# Patient Record
Sex: FEMALE | Race: WHITE | Employment: FULL TIME
[De-identification: names, ages, dates, MRNs, and addresses within clinical notes are randomized per-mention and may not be internally consistent; named-entity substitution may affect disease eponyms.]

---

## 2017-02-06 ENCOUNTER — OFFICE VISIT (OUTPATIENT)
Dept: FAMILY MEDICINE CLINIC | Facility: CLINIC | Age: 57
End: 2017-02-06

## 2017-02-06 VITALS
DIASTOLIC BLOOD PRESSURE: 64 MMHG | BODY MASS INDEX: 20.25 KG/M2 | SYSTOLIC BLOOD PRESSURE: 120 MMHG | TEMPERATURE: 98.3 F | HEIGHT: 67 IN | HEART RATE: 68 BPM | RESPIRATION RATE: 18 BRPM | WEIGHT: 129 LBS

## 2017-02-06 DIAGNOSIS — J01.00 ACUTE NON-RECURRENT MAXILLARY SINUSITIS: Primary | ICD-10-CM

## 2017-02-06 PROCEDURE — 99213 OFFICE O/P EST LOW 20 MIN: CPT | Performed by: NURSE PRACTITIONER

## 2017-02-06 RX ORDER — FLUTICASONE PROPIONATE 50 MCG
2 SPRAY, SUSPENSION (ML) NASAL DAILY
Qty: 1 BOTTLE | Refills: 3 | Status: SHIPPED | OUTPATIENT
Start: 2017-02-06

## 2017-02-06 RX ORDER — AZITHROMYCIN 250 MG/1
TABLET, FILM COATED ORAL
Qty: 1 PACKET | Refills: 0 | Status: SHIPPED | OUTPATIENT
Start: 2017-02-06 | End: 2017-02-16

## 2017-02-06 ASSESSMENT — ENCOUNTER SYMPTOMS
COUGH: 1
SINUS PRESSURE: 1
RHINORRHEA: 1
SORE THROAT: 0
SHORTNESS OF BREATH: 0
CHEST TIGHTNESS: 0

## 2017-09-22 ENCOUNTER — OFFICE VISIT (OUTPATIENT)
Dept: FAMILY MEDICINE CLINIC | Age: 57
End: 2017-09-22
Payer: COMMERCIAL

## 2017-09-22 VITALS
BODY MASS INDEX: 20.05 KG/M2 | OXYGEN SATURATION: 98 % | TEMPERATURE: 98.9 F | DIASTOLIC BLOOD PRESSURE: 70 MMHG | WEIGHT: 128 LBS | SYSTOLIC BLOOD PRESSURE: 120 MMHG | HEART RATE: 100 BPM

## 2017-09-22 DIAGNOSIS — J01.40 ACUTE NON-RECURRENT PANSINUSITIS: Primary | ICD-10-CM

## 2017-09-22 PROCEDURE — 99213 OFFICE O/P EST LOW 20 MIN: CPT | Performed by: NURSE PRACTITIONER

## 2017-09-22 RX ORDER — CEFDINIR 300 MG/1
300 CAPSULE ORAL 2 TIMES DAILY
Qty: 20 CAPSULE | Refills: 0 | Status: SHIPPED | OUTPATIENT
Start: 2017-09-22 | End: 2017-10-02

## 2017-09-22 ASSESSMENT — ENCOUNTER SYMPTOMS
SORE THROAT: 1
COUGH: 1
SWOLLEN GLANDS: 1
SINUS PRESSURE: 1
HOARSE VOICE: 1

## 2017-11-20 ENCOUNTER — OFFICE VISIT (OUTPATIENT)
Dept: FAMILY MEDICINE CLINIC | Age: 57
End: 2017-11-20
Payer: COMMERCIAL

## 2017-11-20 VITALS
OXYGEN SATURATION: 97 % | BODY MASS INDEX: 19.93 KG/M2 | HEIGHT: 67 IN | RESPIRATION RATE: 18 BRPM | HEART RATE: 72 BPM | SYSTOLIC BLOOD PRESSURE: 110 MMHG | WEIGHT: 127 LBS | DIASTOLIC BLOOD PRESSURE: 70 MMHG

## 2017-11-20 DIAGNOSIS — Z78.0 MENOPAUSE: ICD-10-CM

## 2017-11-20 DIAGNOSIS — Z00.00 ANNUAL PHYSICAL EXAM: Primary | ICD-10-CM

## 2017-11-20 DIAGNOSIS — E55.9 VITAMIN D DEFICIENCY: ICD-10-CM

## 2017-11-20 DIAGNOSIS — Z72.89 OTHER PROBLEMS RELATED TO LIFESTYLE: ICD-10-CM

## 2017-11-20 DIAGNOSIS — Z13.220 SCREENING FOR HYPERLIPIDEMIA: ICD-10-CM

## 2017-11-20 DIAGNOSIS — L98.9 SKIN LESION OF FACE: ICD-10-CM

## 2017-11-20 DIAGNOSIS — Z12.39 SCREENING FOR BREAST CANCER: ICD-10-CM

## 2017-11-20 DIAGNOSIS — Z12.39 ENCOUNTER FOR SCREENING BREAST EXAMINATION: ICD-10-CM

## 2017-11-20 DIAGNOSIS — Z83.3 FAMILY HISTORY OF DIABETES MELLITUS IN GRANDMOTHER: ICD-10-CM

## 2017-11-20 PROCEDURE — 99396 PREV VISIT EST AGE 40-64: CPT | Performed by: NURSE PRACTITIONER

## 2017-11-20 ASSESSMENT — ENCOUNTER SYMPTOMS
COUGH: 0
SINUS PRESSURE: 0
ABDOMINAL DISTENTION: 0
EYES NEGATIVE: 1

## 2017-11-20 NOTE — PROGRESS NOTES
[address] [address]  Dept: [phone]  Dept Fax: [phone]    Last encounter 9/22/2017  Visit Information    Have you changed or started any medications since your last visit including any over-the-counter medicines, vitamins, or herbal medicines? no   Are you having any side effects from any of your medications? -  no  Have you stopped taking any of your medications? Is so, why? -  no    Have you seen any other physician or provider since your last visit? No  Have you had any other diagnostic tests since your last visit? No  Have you been seen in the emergency room and/or had an admission to a hospital since we last saw you? No  Have you had your routine dental cleaning in the past 6 months? no    Have you activated your [employer] account? If not, what are your barriers? Yes     Patient Care Team:  Danni Shah NP as PCP - General (Certified Nurse Practitioner)    Medical History Review  Past Medical, Family, and Social History reviewed and does contribute to the patient presenting condition    Health Maintenance   Topic Date Due    Hepatitis C screen  1960    HIV screen  08/24/1974    DTaP/Tdap/Td vaccine (1 - Tdap) 08/24/1978    Lipid screen  08/24/1999    Flu vaccine (1) 09/01/2017    Colon Cancer Screen FIT/FOBT  11/09/2017    Breast cancer screen  12/07/2018    Cervical cancer screen  11/09/2019       HPI:   Blanche Lundy is a 61 y.o. female who presents today for her medical conditions/complaints as noted below. Blanche Lundy is c/o of Check-Up (Breast exam )      HPI    Colon cancer screening: no family hx colon cancer. One hemorrhoid minimal care and no routine meds for it, no change in bowel habits. No blood or mucous in stool weight stable. Menopausal using mendopause over the counter. Influenza vaccine at work : [employer]. Breast cancer: Maternal aunt with breast cancer- after menopause.    Mammogram yearly  Breast exam today. No discharge or lump from breast.     Last pap 15 normal  Age 14 onset of menses  2016menoapuse with final spotting. Regular up until the last 2 years  A0  Vaginal births  No reproductive surgeries  menopausal.   Hx one abnormal pap precancer frozen twice-Dr. Deanna Claude more than 10 years ago.        Self breast exams are done  Mammogram-2016 normal   children      Skin lesion left eyebrow area  Firm hard approx 2 mm in size non tender    Past Medical History:   Diagnosis Date    Chronic back pain     Eczema     Hives     Ulcer ([address]       History reviewed. No pertinent surgical history. Family History   Problem Relation Age of Onset    Heart Disease Mother     Heart Disease Father      aneurysm    Heart Disease Sister      irregular heartbeat    Diabetes Maternal Grandmother     Heart Disease Maternal Grandmother     Osteoarthritis Maternal Grandfather     Alzheimer's Disease Paternal Grandfather     Heart Disease Sister      irregular heartbeat       Social History   Substance Use Topics    Smoking status: Never Smoker    Smokeless tobacco: Never Used    Alcohol use Yes      Comment: rarely      Current Outpatient Prescriptions   Medication Sig Dispense Refill    fluticasone (FLONASE) 50 MCG/ACT nasal spray 2 sprays by Nasal route daily 1 Bottle 3    Omega 3 1000 MG CAPS Take by mouth Indications: Take 1000 units daily      CRANBERRY PO Take by mouth as needed OTC      multivitamin (THERAGRAN) per tablet Take 1 tablet by mouth daily.  Cholecalciferol (VITAMIN D3) 5000 UNITS TABS Take  by mouth daily.  Ascorbic Acid (VITAMIN C) 500 MG tablet Take 500 mg by mouth daily.  Cetirizine HCl (ZYRTEC PO) Take  by mouth. No current facility-administered medications for this visit. Allergies   Allergen Reactions    Pcn [Penicillins] Rash     6th grade.        Health Maintenance   Topic Date Due    Hepatitis C screen 1960    HIV screen  08/24/1974    DTaP/Tdap/Td vaccine (1 - Tdap) 08/24/1978    Lipid screen  08/24/1999    Flu vaccine (1) 09/01/2017    Colon Cancer Screen FIT/FOBT  11/09/2017    Breast cancer screen  12/07/2018    Cervical cancer screen  11/09/2019       Subjective:      Review of Systems   Constitutional: Negative for activity change, chills and fever. HENT: Negative for congestion and sinus pressure. Eyes: Negative. Respiratory: Negative for cough. Cardiovascular: Negative for chest pain. Gastrointestinal: Negative for abdominal distention. Genitourinary: Negative for difficulty urinating. Musculoskeletal: Negative for arthralgias. Neurological: Negative for dizziness. Objective:     Physical Exam   Constitutional: She is oriented to person, place, and time. She appears well-developed and well-nourished. No distress. HENT:   Head: Normocephalic and atraumatic. Right Ear: External ear normal.   Left Ear: External ear normal.   Mouth/Throat: Oropharynx is clear and moist.   Eyes: EOM are normal. Pupils are equal, round, and reactive to light. No scleral icterus. Neck: Normal range of motion. Neck supple. No thyromegaly present. Cardiovascular: Normal rate, regular rhythm, normal heart sounds and intact distal pulses. No murmur heard. Pulmonary/Chest: Effort normal and breath sounds normal. No respiratory distress. She has no wheezes. Right breast exhibits no inverted nipple, no mass, no nipple discharge, no skin change and no tenderness. Left breast exhibits no inverted nipple, no mass, no nipple discharge, no skin change and no tenderness. Abdominal: Soft. Bowel sounds are normal. There is no tenderness. Musculoskeletal: Normal range of motion. She exhibits no edema or tenderness. Lymphadenopathy:     She has no cervical adenopathy. Neurological: She is alert and oriented to person, place, and time. No cranial nerve deficit.    Skin: Skin is warm and dry. No rash noted. Psychiatric: She has a normal mood and affect. Her behavior is normal. Judgment and thought content normal.   Nursing note and vitals reviewed. /70 (Site: Left Arm, Position: Sitting, Cuff Size: Medium Adult)   Pulse 72   Resp 18   Ht 5' 7\" (1.702 m)   Wt 127 lb (57.6 kg)   LMP 04/15/2014   SpO2 97%   BMI 19.89 kg/m²     Data:     No results found for: NA, K, CL, CO2, BUN, CREATININE, GLUCOSE, PROT, LABALBU, BILITOT, ALKPHOS, AST, ALT  Lab Results   Component Value Date    HGB 12.1 08/24/2015     No results found for: TSH  No results found for: CHOL, HDL, PSA, LABA1C       Assessment & Plan       1. Annual physical exam    - Lipid Panel; Future  - HIV Screen; Future  - Hepatitis C Antibody; Future  - Vitamin D 25 Hydroxy; Future    2. Other problems related to lifestyle  No high risk behavior screening recommended by CDC    - HIV Screen; Future  - Hepatitis C Antibody; Future    3. Screening for hyperlipidemia  No stain needs baseline again > 10 years since last  - Lipid Panel; Future    4. Family history of diabetes mellitus in grandmother    11. Menopause  Doing well OTC product used. 6. Vitamin D deficiency  - Vitamin D 25 Hydroxy; Future    7. Screening for breast cancer  Please schedule  - SANTHOSH DIGITAL SCREEN W OR WO CAD BILATERAL; Future    8. Skin lesion of face  Can see dermatology or plastic surgeon for exicision    9. Encounter for screening breast examination    - SANTHOSH DIGITAL SCREEN W OR WO CAD BILATERAL; Future                  Completed Refills   Requested Prescriptions      No prescriptions requested or ordered in this encounter     No Follow-up on file. No orders of the defined types were placed in this encounter.     Orders Placed This Encounter   Procedures    SANTHOSH DIGITAL SCREEN W OR WO CAD BILATERAL     Standing Status:   Future     Standing Expiration Date:   1/20/2019     Order Specific Question:   Reason for exam:     Answer:   screening    Lipid Panel Standing Status:   Future     Standing Expiration Date:   11/20/2018     Order Specific Question:   Is Patient Fasting?/# of Hours     Answer:   15    HIV Screen     Standing Status:   Future     Standing Expiration Date:   11/20/2018    Hepatitis C Antibody     Standing Status:   Future     Standing Expiration Date:   11/20/2018    Vitamin D 25 Hydroxy     Standing Status:   Future     Standing Expiration Date:   11/20/2018         Yolanda received counseling on the following healthy behaviors: nutrition, exercise and medication adherence  Reviewed prior labs and health maintenance. Continue current medications, diet and exercise. Discussed use, benefit, and side effects of prescribed medications. Barriers to medication compliance addressed. Patient given educational materials - see patient instructions. All patient questions answered. Patient voiced understanding.           Electronically signed by Pablo Titus NP on 11/20/2017 at 11:21 PM

## 2017-11-20 NOTE — PATIENT INSTRUCTIONS
Patient Education   General surgeon Anny zhou: Dr. Eric Abreu: Dr. Isatu Morrissey,  General surgeon. Gastroenterologist: Dr. Cheli Gillette         Learning About Colonoscopy  What is a colonoscopy? A colonoscopy is a test (also called a procedure) that lets a doctor look inside your large intestine. The doctor uses a thin, lighted tube called a colonoscope. The doctor uses it to look for small growths called polyps, colon or rectal cancer (colorectal cancer), or other problems like bleeding. During the procedure, the doctor can take samples of tissue. The samples can then be checked for cancer or other conditions. The doctor can also take out polyps. How is colonoscopy done? This procedure is done in a doctor's office or a clinic or hospital. You will get medicine to help you relax and not feel pain. Some people find that they do not remember having the test because of the medicine. The doctor gently moves the colonoscope, or scope, through the colon. The scope is also a small video camera. It lets the doctor see the colon and take pictures. A colonoscopy usually takes 30 to 45 minutes. It may take longer if the doctor has to remove polyps. How do you prepare for the procedure? You need to clean out your colon before the procedure so the doctor can see all of your colon. You may start the cleaning process a day or two before the test. This depends on which \"colon prep\" your doctor recommends. To clean your colon, you stop eating solid foods and drink only clear liquids. You can have water, tea, coffee, clear juices, clear broths, flavored ice pops, and gelatin (such as Jell-O). Do not drink anything red or purple, such as grape juice or fruit punch. And do not eat red or purple foods, such as grape ice pops or cherry gelatin. The day or night before the procedure, you drink a large amount of a special liquid. This causes loose, frequent stools. You will go to the bathroom a lot.  It is very important to or liability for your use of this information.

## 2017-11-22 ENCOUNTER — TELEPHONE (OUTPATIENT)
Dept: FAMILY MEDICINE CLINIC | Age: 57
End: 2017-11-22

## 2017-12-27 ENCOUNTER — HOSPITAL ENCOUNTER (OUTPATIENT)
Dept: MAMMOGRAPHY | Age: 57
Discharge: HOME | End: 2017-12-27
Payer: COMMERCIAL

## 2017-12-27 DIAGNOSIS — Z12.39 ENCOUNTER FOR SCREENING BREAST EXAMINATION: ICD-10-CM

## 2017-12-27 DIAGNOSIS — Z12.39 SCREENING FOR BREAST CANCER: ICD-10-CM

## 2017-12-27 PROCEDURE — G0202 SCR MAMMO BI INCL CAD: HCPCS

## 2018-10-03 ENCOUNTER — OFFICE VISIT (OUTPATIENT)
Dept: PRIMARY CARE CLINIC | Age: 58
End: 2018-10-03
Payer: COMMERCIAL

## 2018-10-03 VITALS
DIASTOLIC BLOOD PRESSURE: 73 MMHG | HEART RATE: 84 BPM | BODY MASS INDEX: 20.05 KG/M2 | WEIGHT: 128 LBS | TEMPERATURE: 98.6 F | OXYGEN SATURATION: 97 % | SYSTOLIC BLOOD PRESSURE: 113 MMHG

## 2018-10-03 DIAGNOSIS — L23.7 ALLERGIC CONTACT DERMATITIS DUE TO PLANT: Primary | ICD-10-CM

## 2018-10-03 PROCEDURE — 99213 OFFICE O/P EST LOW 20 MIN: CPT | Performed by: NURSE PRACTITIONER

## 2018-10-03 RX ORDER — PREDNISONE 10 MG/1
TABLET ORAL
Qty: 54 TABLET | Refills: 0 | Status: SHIPPED | OUTPATIENT
Start: 2018-10-03 | End: 2019-08-27 | Stop reason: ALTCHOICE

## 2018-10-03 ASSESSMENT — ENCOUNTER SYMPTOMS
VOMITING: 0
COUGH: 0
SHORTNESS OF BREATH: 0
DIARRHEA: 0

## 2018-10-03 NOTE — PROGRESS NOTES
place, and time. Skin: Skin is warm and dry. Lesion and rash noted. No abrasion, no bruising, no ecchymosis and no petechiae noted. She is not diaphoretic. There is erythema. No pallor. Left side of face near bridge of nose and left eye and left cheek with erythema, mild edema and maculopapular, uritcarial rash. Few isolated spots of erythematous, urticarial, papulovesicular rash to left arm. No drainage, bleeding, seeping, crusting or red streaking. No focal warmth or tenderness. Psychiatric: She has a normal mood and affect. Her behavior is normal.   Nursing note and vitals reviewed. /73   Pulse 84   Temp 98.6 °F (37 °C)   Wt 128 lb (58.1 kg)   LMP 04/15/2014   SpO2 97%   BMI 20.05 kg/m²     Assessment:      Diagnosis Orders   1. Allergic contact dermatitis due to plant  predniSONE (DELTASONE) 10 MG tablet       Plan:      Return if symptoms worsen or fail to improve, for Resume all previous medications as directed. Orders Placed This Encounter   Medications    predniSONE (DELTASONE) 10 MG tablet     Sig: Take 6 tabs Days 1-4, 5 tabs Days 5-6, 4 tabs Days 7-8, 3 tabs Days 9-10, 2 tabs Days 11-12, 1 tab Day 13-14     Dispense:  54 tablet     Refill:  0      · If avoidance is not possible, apply a barrier lotion such as [employer], [employer] [place], Zinc Oxide paste or Desenex prior to potential exposure. · Exposed clothing, shoes, tools, camping equipment and pets are to be washed   thoroughly to remove allergen. ·  If contact occurs, wash skin with soap and water or Zanfel within 15 minutes of contact. · Prednisone 10 mg tablets, 6 tablets on Days 1 thru 4, 5 tablets on Days 5 and 6, 4 tablets on Days 7 and 8, 3 tablets on Days 9 and 10, 2 tablets on Days 11 and 12 and 1 tablet on Days 13 and 14. Complete all doses as prescribed. Denies history of impaired liver function, diabetes, CHF, systemic fungal infection, osteoporosis, or glaucoma. Take with food at same time each day. Take the

## 2018-10-03 NOTE — PATIENT INSTRUCTIONS
SURVEY:    You may be receiving a survey from [employer] regarding your visit today. Please complete the survey to enable us to provide the highest quality of care to you and your family. If you cannot score us as very good on any question, please call the office to discuss how we could have made your experience exceptional.     Thank you. Patient Education   Patient Education          prednisone  Pronunciation:  PRED berhane martin  Brand:  Ursula, Sterapred, Sterapred 12 DAY, Sterapred DS, Sterapred DS 12 DAY  What is the most important information I should know about prednisone? Prednisone treats many different conditions such as allergic disorders, skin conditions, ulcerative colitis, arthritis, lupus, psoriasis, or breathing disorders. You should not take prednisone if you have a fungal infection anywhere in your body. Steroid medication can weaken your immune system, making it easier for you to get an infection. Avoid being near people who are sick or have infections. Do not receive a \"live\" vaccine while using prednisone. Call your doctor at once if you have shortness of breath, severe pain in your upper stomach, bloody or tarry stools, severe depression, changes in personality or behavior, vision problems, or eye pain. You should not stop using prednisone suddenly. Follow your doctor's instructions about tapering your dose. What is prednisone? Prednisone is a steroid. Prednisone prevents the release of substances in the body that cause inflammation. Prednisone also suppresses the immune system. Prednisone is used as an anti-inflammatory or an immunosuppressant medication. Prednisone treats many different conditions such as allergic disorders, skin conditions, ulcerative colitis, arthritis, lupus, psoriasis, or breathing disorders. Prednisone may also be used for purposes not listed in this medication guide. What should I discuss with my healthcare provider before taking prednisone?   You should no guarantee is made to that effect. Drug information contained herein may be time sensitive. [employer] information has been compiled for use by healthcare practitioners and consumers in the [place] and therefore [employer] does not warrant that uses outside of the [place] are appropriate, unless specifically indicated otherwise. [facility] drug information does not endorse drugs, diagnose patients or recommend therapy. Categorical drug information is an informational resource designed to assist licensed healthcare practitioners in caring for their patients and/or to serve consumers viewing this service as a supplement to, and not a substitute for, the expertise, skill, knowledge and judgment of healthcare practitioners. The absence of a warning for a given drug or drug combination in no way should be construed to indicate that the drug or drug combination is safe, effective or appropriate for any given patient. [facility] does not assume any responsibility for any aspect of healthcare administered with the aid of information [facility] provides. The information contained herein is not intended to cover all possible uses, directions, precautions, warnings, drug interactions, allergic reactions, or adverse effects. If you have questions about the drugs you are taking, check with your doctor, nurse or pharmacist.  Copyright 6557-3267 [address] Version: 9.01. Revision date: 2/13/2013. Care instructions adapted under license by [facility] ([facility]). If you have questions about a medical condition or this instruction, always ask your healthcare professional. [place] [address] any warranty or liability for your use of this information. Poison [place], [place], and [place]: Care Instructions  Your Care Instructions    Poison ivy, poison oak, and poison sumac are plants that can cause a skin rash upon contact.  The red, itchy rash often shows up in lines or streaks and may cause fluid-filled blisters or large, Original Outdoor Skin Cleanser. These products can also be used to clean plant oil from clothing or tools. When should you call for help? Call your doctor now or seek immediate medical care if:    · Your rash gets worse, and you start to feel bad and have a fever, a stiff neck, nausea, and vomiting.     · You have signs of infection, such as:  ¨ Increased pain, swelling, warmth, or redness. ¨ Red streaks leading from the rash. ¨ Pus draining from the rash. ¨ A fever.    Watch closely for changes in your health, and be sure to contact your doctor if:    · You have new blisters or bruises, or the rash spreads and looks like a sunburn.     · The rash gets worse, or it comes back after nearly disappearing.     · You think a medicine you are using is making your rash worse.     · Your rash does not clear up after 1 to 2 weeks of home treatment.     · You have joint aches or body aches with your rash. Where can you learn more? Go to [email or web address] org and sign in to your [employer]IC account. Enter X896 in the RECOMY.COM box to learn more about \"Poison [place], [place], and [place]: Care Instructions. \"     If you do not have an account, please click on the \"Sign Up Now\" link. Current as of: October 5, 2017  Content Version: 11.7  © 2596-0100 [employer]. Care instructions adapted under license by [facility] ([facility]). If you have questions about a medical condition or this instruction, always ask your healthcare professional. [place] [address] any warranty or liability for your use of this information. · If avoidance is not possible, apply a barrier lotion such as [employer], Work [employer] [place], Zinc Oxide paste or Desenex prior to potential exposure. · Exposed clothing, shoes, tools, camping equipment and pets are to be washed   thoroughly to remove allergen. ·  If contact occurs, wash skin with soap and water or Zanfel within 15 minutes of contact.   · Prednisone 10 mg tablets, 6 tablets on Days 1 thru 4, 5 tablets on Days 5 and 6, 4 tablets on Days 7 and 8, 3 tablets on Days 9 and 10, 2 tablets on Days 11 and 12 and 1 tablet on Days 13 and 14. Complete all doses as prescribed. Denies history of impaired liver function, diabetes, CHF, systemic fungal infection, osteoporosis, or glaucoma. Take with food at same time each day. Take the prednisone taper as directed on the prescription. Prednisone is very bitter so swallow tablets quickly to prevent dissolving in mouth. After taking, don't lay down for 2 hours to help prevent reflux. · Cetirizine 10 mg tablet by mouth once a day for itching. · Oatmeal baths or cool compresses to soothe itching  · Patient instructions given for Poison Ivy and Prednisone. · Follow up with PCP immediately if symptoms worsen or signs of secondary infection   develop, such as fever, purulent drainage and/or increasing pain. · Follow up with PCP in 3-4 days for re-evaluation of dermatitis requiring topical or oral   corticosteroid use. · To ER or call 911 if any difficulty breathing, shortness of breath, inability to swallow, hives, facial/tongue swelling or temp greater than 103 degrees.

## 2019-01-09 ENCOUNTER — HOSPITAL ENCOUNTER (OUTPATIENT)
Dept: MAMMOGRAPHY | Age: 59
Discharge: HOME | End: 2019-01-11
Payer: COMMERCIAL

## 2019-01-09 DIAGNOSIS — Z12.39 BREAST CANCER SCREENING: ICD-10-CM

## 2019-01-09 PROCEDURE — 77067 SCR MAMMO BI INCL CAD: CPT

## 2019-08-27 ENCOUNTER — OFFICE VISIT (OUTPATIENT)
Dept: FAMILY MEDICINE CLINIC | Age: 59
End: 2019-08-27
Payer: COMMERCIAL

## 2019-08-27 VITALS
OXYGEN SATURATION: 98 % | WEIGHT: 127 LBS | BODY MASS INDEX: 19.93 KG/M2 | HEART RATE: 87 BPM | TEMPERATURE: 98.4 F | HEIGHT: 67 IN | SYSTOLIC BLOOD PRESSURE: 110 MMHG | DIASTOLIC BLOOD PRESSURE: 70 MMHG

## 2019-08-27 DIAGNOSIS — J01.00 ACUTE NON-RECURRENT MAXILLARY SINUSITIS: Primary | ICD-10-CM

## 2019-08-27 DIAGNOSIS — L30.9 ECZEMA OF LEFT HAND: ICD-10-CM

## 2019-08-27 PROCEDURE — 99213 OFFICE O/P EST LOW 20 MIN: CPT | Performed by: FAMILY MEDICINE

## 2019-08-27 RX ORDER — AZITHROMYCIN 250 MG/1
250 TABLET, FILM COATED ORAL SEE ADMIN INSTRUCTIONS
Qty: 6 TABLET | Refills: 0 | Status: SHIPPED | OUTPATIENT
Start: 2019-08-27 | End: 2019-09-01

## 2019-08-27 ASSESSMENT — PATIENT HEALTH QUESTIONNAIRE - PHQ9
SUM OF ALL RESPONSES TO PHQ9 QUESTIONS 1 & 2: 0
2. FEELING DOWN, DEPRESSED OR HOPELESS: 0
SUM OF ALL RESPONSES TO PHQ QUESTIONS 1-9: 0
SUM OF ALL RESPONSES TO PHQ QUESTIONS 1-9: 0
1. LITTLE INTEREST OR PLEASURE IN DOING THINGS: 0

## 2019-08-27 ASSESSMENT — ENCOUNTER SYMPTOMS: GASTROINTESTINAL NEGATIVE: 1

## 2019-08-27 NOTE — PROGRESS NOTES
Name: Adiel Leal  : 1960         Chief Complaint:     Chief Complaint   Patient presents with    Sinus Problem     sinus congestion, cough, tickle in throat. started 1 week ago. History of Present Illness:      Adiel Leal is a 60 y.o.  female who presents with Sinus Problem (sinus congestion, cough, tickle in throat. started 1 week ago.)      HPI    Pt sick for the past week with URI symptoms. Most bothered by cough r/t PND with tickle in throat. Started to get better but then in past couple days developed sinus pressure. Typically when she gets these symptoms zyrtec helps but it hasn't. Feeling rundown. Zyrtec causing the eczema on her hands to be dried out more also. Uses eucerin and eucrisa and had had the rash almost nearly cleared up prior to starting the zyrtec. Also has ultravate. Medical History:     Patient Active Problem List   Diagnosis    Eczema of left hand       Medications:       Prior to Admission medications    Medication Sig Start Date End Date Taking? Authorizing Provider   azithromycin (ZITHROMAX) 250 MG tablet Take 1 tablet by mouth See Admin Instructions for 5 days 500mg on day 1 followed by 250mg on days 2 - 5 19 Yes Stuart Singh DO   Halobetasol Propionate (ULTRAVATE) 0.05 % LOTN Apply topically 17  Yes Historical Provider, MD   fluticasone (FLONASE) 50 MCG/ACT nasal spray 2 sprays by Nasal route daily 17  Yes TRAVIS Looney CNP   Omega 3 1000 MG CAPS Take by mouth Indications: Take 1000 units daily   Yes Historical Provider, MD   CRANBERRY PO Take by mouth as needed OTC   Yes Historical Provider, MD   multivitamin [facility]) per tablet Take 1 tablet by mouth daily. Yes Historical Provider, MD   Cholecalciferol (VITAMIN D3) 5000 UNITS TABS Take  by mouth daily. Yes Historical Provider, MD   Ascorbic Acid (VITAMIN C) 500 MG tablet Take 500 mg by mouth daily.      Yes Historical Provider, MD   Cetirizine HCl (ZYRTEC PO) Take  by

## 2020-01-13 ENCOUNTER — HOSPITAL ENCOUNTER (OUTPATIENT)
Age: 60
Setting detail: SPECIMEN
Discharge: HOME | End: 2020-01-13
Payer: COMMERCIAL

## 2020-01-13 ENCOUNTER — OFFICE VISIT (OUTPATIENT)
Dept: FAMILY MEDICINE CLINIC | Age: 60
End: 2020-01-13
Payer: COMMERCIAL

## 2020-01-13 VITALS
HEIGHT: 67 IN | OXYGEN SATURATION: 97 % | DIASTOLIC BLOOD PRESSURE: 74 MMHG | BODY MASS INDEX: 19.62 KG/M2 | SYSTOLIC BLOOD PRESSURE: 120 MMHG | HEART RATE: 72 BPM | WEIGHT: 125 LBS

## 2020-01-13 PROCEDURE — G0145 SCR C/V CYTO,THINLAYER,RESCR: HCPCS

## 2020-01-13 PROCEDURE — 87186 SC STD MICRODIL/AGAR DIL: CPT

## 2020-01-13 PROCEDURE — 99396 PREV VISIT EST AGE 40-64: CPT | Performed by: FAMILY MEDICINE

## 2020-01-13 PROCEDURE — 87077 CULTURE AEROBIC IDENTIFY: CPT

## 2020-01-13 PROCEDURE — 87624 HPV HI-RISK TYP POOLED RSLT: CPT

## 2020-01-13 PROCEDURE — 87086 URINE CULTURE/COLONY COUNT: CPT

## 2020-01-13 ASSESSMENT — PATIENT HEALTH QUESTIONNAIRE - PHQ9
2. FEELING DOWN, DEPRESSED OR HOPELESS: 0
SUM OF ALL RESPONSES TO PHQ QUESTIONS 1-9: 0
SUM OF ALL RESPONSES TO PHQ QUESTIONS 1-9: 0
SUM OF ALL RESPONSES TO PHQ9 QUESTIONS 1 & 2: 0
1. LITTLE INTEREST OR PLEASURE IN DOING THINGS: 0

## 2020-01-13 NOTE — PROGRESS NOTES
Name: Chely Moss  : 1960         Chief Complaint:     Chief Complaint   Patient presents with    Gynecologic Exam    Dysuria       History of Present Illness:      Chely Moss is a 60 y.o.  female who presents with Gynecologic Exam and Dysuria      HPI     Patient presents for routine GYN exam.  Postmenopausal for a number of years and has not had any bleeding or spotting. She has had some dysuria and suprapubic pressure for a month or so. Past Medical History:     Past Medical History:   Diagnosis Date    Chronic back pain     Eczema     Hives     Ulcer         Past Surgical History:     Past Surgical History:   Procedure Laterality Date    COLONOSCOPY  07/15/2019    Dr. Villalobos- diverticulosis/hemorrhoids, personal hx adenoma, repeat in 5 years=         Medications:       Prior to Admission medications    Medication Sig Start Date End Date Taking? Authorizing Provider   halobetasol (ULTRAVATE) 0.05 % cream Apply topically 2 times daily as needed for eczema 20 Yes Reanna Mcpherson DO   fluticasone (FLONASE) 50 MCG/ACT nasal spray 2 sprays by Nasal route daily 17   TRAVIS Grant - CNP   Omega 3 1000 MG CAPS Take by mouth Indications: Take 1000 units daily    Historical Provider, MD   CRANBERRY PO Take by mouth as needed OTC    Historical Provider, MD   multivitamin [facility]) per tablet Take 1 tablet by mouth daily. Historical Provider, MD   Cholecalciferol (VITAMIN D3) 5000 UNITS TABS Take  by mouth daily. Historical Provider, MD   Ascorbic Acid (VITAMIN C) 500 MG tablet Take 500 mg by mouth daily. Historical Provider, MD   Cetirizine HCl (ZYRTEC PO) Take  by mouth. Historical Provider, MD        Allergies:       Pcn [penicillins]    Social History:     Tobacco:    reports that she has never smoked. She has never used smokeless tobacco.  Alcohol:      reports current alcohol use. Drug Use:  reports no history of drug use.     Family History: Family History   Problem Relation Age of Onset    Heart Disease Mother     Heart Disease Father         aneurysm    Heart Disease Sister         irregular heartbeat    Diabetes Maternal Grandmother     Heart Disease Maternal Grandmother     Osteoarthritis Maternal Grandfather     Alzheimer's Disease Paternal Grandfather     Heart Disease Sister         irregular heartbeat       Review of Systems:     Positive and Negative as described in HPI    Review of Systems   Constitutional: Negative. Respiratory: Negative. Cardiovascular: Negative. Gastrointestinal: Negative. Endocrine: Negative. Musculoskeletal: Negative. Skin: Negative. Physical Exam:     Vitals:  /74   Pulse 72   Ht 5' 7\" (1.702 m)   Wt 125 lb (56.7 kg)   LMP 04/15/2014   SpO2 97%   BMI 19.58 kg/m²   Physical Exam  Vitals signs and nursing note reviewed. Constitutional:       General: She is not in acute distress. Appearance: She is well-developed. Pulmonary:      Effort: Pulmonary effort is normal.   Chest:      Breasts:         Right: No mass or skin change. Left: No mass or skin change. Genitourinary:     General: Normal vulva. Labia:         Right: No lesion. Left: No lesion. Uretha: Urethral lesion: very mild erythema lateral to meatus bilaterally. Cervix: No cervical motion tenderness, discharge or friability. Uterus: Not enlarged and not tender. Adnexa:         Right: No mass or tenderness. Left: No mass or tenderness. Lymphadenopathy:      Upper Body:      Right upper body: No supraclavicular or axillary adenopathy. Left upper body: No supraclavicular or axillary adenopathy. Skin:     General: Skin is warm and dry. Neurological:      Mental Status: She is alert and oriented to person, place, and time.    Psychiatric:         Judgment: Judgment normal.         Data:     No results found for: NA, K, CL, CO2, BUN, CREATININE, GLUCOSE, PROT, LABALBU, BILITOT, ALKPHOS, AST, ALT  Lab Results   Component Value Date    HGB 12.1 08/24/2015     No results found for: TSH  No results found for: CHOL, HDL, PSA, LABA1C    UA small blood, trace leuk    Assessment & Plan:        Diagnosis Orders   1. Well woman exam with routine gynecological exam     2. Visit for screening mammogram  [facility] DIGITAL SCREEN W CAD BILATERAL   3. Microscopic hematuria  Urine Culture   4. Screening for cervical cancer  PAP Smear   Breast and pelvic exam within normal limits. Pap collected with cotesting. Has also had some suprapubic pressure and dysuria which has been going on for quite a while. UA equivocal.  Sending for culture and will treat based on results. Also provided patient with Rx for Ultravate cream which she had used from someone else's supply and had great success with eczema of her hand. Patient had had Ultravate lotion in the past and the cream worked a lot better. Requested Prescriptions     Signed Prescriptions Disp Refills    halobetasol (ULTRAVATE) 0.05 % cream 50 g 1     Sig: Apply topically 2 times daily as needed for eczema       There are no Patient Instructions on file for this visit. Yolanda received counseling on the following healthy behaviors: medication adherence  Reviewed prior labs and health maintenance. Continue current medications, diet and exercise. Discussed use, benefit, and side effects of prescribed medications. Barriers to medication compliance addressed. Patient given educational materials - see patient instructions. All patient questions answered. Patient voiced understanding.      Electronically signed by Dakota Grimes DO on 1/14/2020 at 9:30 PM   [address]  Dept: [phone]

## 2020-01-14 ASSESSMENT — ENCOUNTER SYMPTOMS
GASTROINTESTINAL NEGATIVE: 1
RESPIRATORY NEGATIVE: 1

## 2020-01-15 LAB
CULTURE: ABNORMAL
Lab: ABNORMAL
SPECIMEN DESCRIPTION: ABNORMAL

## 2020-01-15 RX ORDER — SULFAMETHOXAZOLE AND TRIMETHOPRIM 800; 160 MG/1; MG/1
1 TABLET ORAL 2 TIMES DAILY
Qty: 14 TABLET | Refills: 0 | Status: SHIPPED | OUTPATIENT
Start: 2020-01-15 | End: 2020-01-22

## 2020-01-15 NOTE — TELEPHONE ENCOUNTER
----- Message from Carson Bradford DO sent at 1/15/2020 11:13 AM EST -----  Does have a UTI. Bactrim DS 1 pill twice a day for 7 days please. Does not need to have follow-up test afterwards.

## 2020-01-16 LAB
HPV SAMPLE: NORMAL
HPV, GENOTYPE 16: NOT DETECTED
HPV, GENOTYPE 18: NOT DETECTED
HPV, HIGH RISK OTHER: NOT DETECTED
HPV, INTERPRETATION: NORMAL
SPECIMEN DESCRIPTION: NORMAL

## 2020-01-20 LAB — CYTOLOGY REPORT: NORMAL

## 2020-01-22 ENCOUNTER — HOSPITAL ENCOUNTER (OUTPATIENT)
Dept: MAMMOGRAPHY | Age: 60
Discharge: HOME | End: 2020-01-24
Payer: COMMERCIAL

## 2020-01-22 PROCEDURE — 77067 SCR MAMMO BI INCL CAD: CPT

## 2021-09-29 ENCOUNTER — TELEPHONE (OUTPATIENT)
Dept: FAMILY MEDICINE CLINIC | Age: 61
End: 2021-09-29

## 2021-09-29 NOTE — TELEPHONE ENCOUNTER
Hilda De La O has recently moved to [place], [place] and has not currently found a new PCP. She said yesterday she felt she had gotten the start of a sinus infecetion. Congestion, headache, drainage, and last night she felt feverish. She said she gets this all the time. She is wanting to know if she can have something called in for her? She has tried zyrtec and that hasn't dried her up and she is also trying flonase. I did let her know she may need to find an urgent care and possibly get tested for COVID. Please let Yolanda know. [address] [place] [address]. Health Maintenance   Topic Date Due    Hepatitis C screen  Never done    COVID-19 Vaccine (1) Never done    HIV screen  Never done    DTaP/Tdap/Td vaccine (1 - Tdap) Never done    Lipid screen  Never done    Shingles Vaccine (1 of 2) Never done    Flu vaccine (1) 09/01/2021    Breast cancer screen  01/22/2022    Cervical cancer screen  01/13/2025    Colon cancer screen colonoscopy  07/15/2029    Hepatitis A vaccine  Aged Out    Hepatitis B vaccine  Aged Out    Hib vaccine  Aged Out    Meningococcal (ACWY) vaccine  Aged Out    Pneumococcal 0-64 years Vaccine  Aged Out             (applicable per patient's age: Cancer Screenings, Depression Screening, Fall Risk Screening, Immunizations)    No results found for: LABA1C, LABMICR, LDLCHOLESTEROL, LDLCALC, AST, ALT, BUN   (goal A1C is < 7)   (goal LDL is <100) need 30-50% reduction from baseline     BP Readings from Last 3 Encounters:   01/13/20 120/74   08/27/19 110/70   10/03/18 113/73    (goal /80)      All Future Testing planned in [facility]:  Lab Frequency Next Occurrence       Next Visit Date:  No future appointments.          Patient Active Problem List:     Eczema of left hand

## 2025-02-27 ENCOUNTER — TELEPHONE (OUTPATIENT)
Dept: FAMILY MEDICINE CLINIC | Age: 65
End: 2025-02-27

## 2025-02-27 DIAGNOSIS — Z12.31 VISIT FOR SCREENING MAMMOGRAM: Primary | ICD-10-CM

## 2025-02-27 NOTE — TELEPHONE ENCOUNTER
----- Message from Megan ERID sent at 2/27/2025  9:37 AM EST -----  Regarding: [facility] Referral Request  [facility] Referral Request    Reason for referral request: Lab/Test Order    Specialist/Lab/Test patient is requesting (if known): Mammogram    Specialist Phone Number (if applicable): none     Additional Information : The patient requesting for a mammogram to be schedule on August 18, 2025 together with her Annual wellness visit  --------------------------------------------------------------------------------------------------------------------------    Relationship to Patient: Self     Call Back Information: OK to leave message on voicemail  Preferred Call Back Number: Phone  [phone]

## 2025-02-27 NOTE — TELEPHONE ENCOUNTER
Advised pt the mammogram is ordered. Pt scheduling for 08/18/25 last mammogram was on 08/15/24. -FYI

## 2025-08-18 ENCOUNTER — OFFICE VISIT (OUTPATIENT)
Dept: FAMILY MEDICINE CLINIC | Age: 65
End: 2025-08-18
Payer: MEDICARE

## 2025-08-18 ENCOUNTER — HOSPITAL ENCOUNTER (OUTPATIENT)
Dept: LAB | Age: 65
Discharge: HOME | End: 2025-08-18
Attending: FAMILY MEDICINE
Payer: MEDICARE

## 2025-08-18 ENCOUNTER — HOSPITAL ENCOUNTER (OUTPATIENT)
Dept: MAMMOGRAPHY | Age: 65
Discharge: HOME | End: 2025-08-20
Attending: FAMILY MEDICINE
Payer: MEDICARE

## 2025-08-18 VITALS
BODY MASS INDEX: 19.42 KG/M2 | SYSTOLIC BLOOD PRESSURE: 100 MMHG | DIASTOLIC BLOOD PRESSURE: 64 MMHG | OXYGEN SATURATION: 99 % | HEIGHT: 67 IN | HEART RATE: 63 BPM | WEIGHT: 123.7 LBS

## 2025-08-18 DIAGNOSIS — Z00.00 INITIAL MEDICARE ANNUAL WELLNESS VISIT: Primary | ICD-10-CM

## 2025-08-18 DIAGNOSIS — Z12.31 VISIT FOR SCREENING MAMMOGRAM: ICD-10-CM

## 2025-08-18 DIAGNOSIS — Z13.6 SCREENING FOR CARDIOVASCULAR CONDITION: ICD-10-CM

## 2025-08-18 LAB
ALBUMIN SERPL-MCNC: 4.3 G/DL (ref 3.5–5.2)
ALBUMIN/GLOB SERPL: 1.7 {RATIO} (ref 1–2.5)
ALP SERPL-CCNC: 97 U/L (ref 35–104)
ALT SERPL-CCNC: 23 U/L (ref 5–33)
ANION GAP SERPL CALCULATED.3IONS-SCNC: 10 MMOL/L (ref 9–17)
AST SERPL-CCNC: 24 U/L
BILIRUB SERPL-MCNC: 0.8 MG/DL (ref 0.3–1.2)
BUN SERPL-MCNC: 16 MG/DL (ref 8–23)
CALCIUM SERPL-MCNC: 8.9 MG/DL (ref 8.6–10.4)
CHLORIDE SERPL-SCNC: 103 MMOL/L (ref 98–107)
CHOLEST SERPL-MCNC: 216 MG/DL (ref 0–199)
CHOLESTEROL/HDL RATIO: 3
CO2 SERPL-SCNC: 27 MMOL/L (ref 20–31)
CREAT SERPL-MCNC: 0.5 MG/DL (ref 0.5–0.9)
GFR, ESTIMATED: >90 ML/MIN/1.73M2
GLUCOSE SERPL-MCNC: 96 MG/DL (ref 70–99)
HDLC SERPL-MCNC: 72 MG/DL
LDLC SERPL CALC-MCNC: 123 MG/DL (ref 0–100)
POTASSIUM SERPL-SCNC: 4.1 MMOL/L (ref 3.7–5.3)
PROT SERPL-MCNC: 6.9 G/DL (ref 6.4–8.3)
SODIUM SERPL-SCNC: 140 MMOL/L (ref 135–144)
TRIGL SERPL-MCNC: 104 MG/DL
VLDLC SERPL CALC-MCNC: 21 MG/DL (ref 1–30)

## 2025-08-18 PROCEDURE — 1123F ACP DISCUSS/DSCN MKR DOCD: CPT | Performed by: FAMILY MEDICINE

## 2025-08-18 PROCEDURE — 80061 LIPID PANEL: CPT

## 2025-08-18 PROCEDURE — 3017F COLORECTAL CA SCREEN DOC REV: CPT | Performed by: FAMILY MEDICINE

## 2025-08-18 PROCEDURE — G0438 PPPS, INITIAL VISIT: HCPCS | Performed by: FAMILY MEDICINE

## 2025-08-18 PROCEDURE — 80053 COMPREHEN METABOLIC PANEL: CPT

## 2025-08-18 PROCEDURE — 77063 BREAST TOMOSYNTHESIS BI: CPT

## 2025-08-18 PROCEDURE — 36415 COLL VENOUS BLD VENIPUNCTURE: CPT

## 2025-08-18 RX ORDER — DOXYCYCLINE 100 MG/1
100 CAPSULE ORAL 2 TIMES DAILY
COMMUNITY
Start: 2025-08-12

## 2025-08-18 SDOH — ECONOMIC STABILITY: FOOD INSECURITY: WITHIN THE PAST 12 MONTHS, THE FOOD YOU BOUGHT JUST DIDN'T LAST AND YOU DIDN'T HAVE MONEY TO GET MORE.: NEVER TRUE

## 2025-08-18 SDOH — ECONOMIC STABILITY: FOOD INSECURITY: WITHIN THE PAST 12 MONTHS, YOU WORRIED THAT YOUR FOOD WOULD RUN OUT BEFORE YOU GOT MONEY TO BUY MORE.: NEVER TRUE

## 2025-08-18 ASSESSMENT — LIFESTYLE VARIABLES
HOW MANY STANDARD DRINKS CONTAINING ALCOHOL DO YOU HAVE ON A TYPICAL DAY: 1 OR 2
HOW OFTEN DO YOU HAVE A DRINK CONTAINING ALCOHOL: 2-4 TIMES A MONTH

## 2025-08-18 ASSESSMENT — PATIENT HEALTH QUESTIONNAIRE - PHQ9
2. FEELING DOWN, DEPRESSED OR HOPELESS: NOT AT ALL
SUM OF ALL RESPONSES TO PHQ QUESTIONS 1-9: 0
SUM OF ALL RESPONSES TO PHQ QUESTIONS 1-9: 0
1. LITTLE INTEREST OR PLEASURE IN DOING THINGS: NOT AT ALL
SUM OF ALL RESPONSES TO PHQ QUESTIONS 1-9: 0
SUM OF ALL RESPONSES TO PHQ QUESTIONS 1-9: 0